# Patient Record
(demographics unavailable — no encounter records)

---

## 2025-01-28 NOTE — ASSESSMENT
[FreeTextEntry1] : HLD- Recommend a statin since 10 yr risk of CAD is 18 %. Deferred but he agrees to a CCS Score is 6, I still want him to take a statin, his PCP gave him Crestor which he will take. Told to take 5 mg QOD, and not randomly. He still has not committed to it. He takes twice a week. Crestor 10MG QOD. Told to take Crestor 10 mg QOD. Goal is LDL < 70, 107.  Consider increasing to 20 QOD  Tobacco- Will need to motivate pt to quit.  Urine- Trace proteinuria  Pulmonary Nodule- 5 mm nodule seen on CT done for a CCS Aug 2021. He needs a 1 year f/u. Discussed with pt 9/21/21, he will try the patches for 10 weeks. CT Aug 2022, 5 mm nodule stable but new 5 mm Nodule, rec repeat CT in 6 mos. Discussed with pt and wife. He enjoys smoking, no intention to quit. New 5mm nodule April 2023, short term f/u ordered.  Stable Dec 2023,and Dec 2024  f/u 1 yr  Borderline HTN- Non-Pharm approach reviewed.  Colonoscopy current, PSA NL 2023. Needs EDILSON.

## 2025-01-28 NOTE — REASON FOR VISIT
[FreeTextEntry1] : 63 y/o Male without signif PMH and no prior surgery.  here without c/o.  Currrently smokes 1 PPWeek since age 23. Had Covid vaccines.  Recent labs reveal  NL glucose and Cr.  , HDL 54, , TG was 291 He does exercise, has a [place]. 2/7/22 Still smokes 3-4 cigarettes daily.  BP borderline up, takes Crestor randomly, LDL 98 Nov 2021 with PCP 9/6/22 Here with his wife, medical hx reviewed in detail. 8/2/23 , A1C 5.5, still smoking, wife present 12/21/23 Still exercising LDL  107 1/28/25 , smoking a little NSR, normal axis and intervals, no ST-Tw abnormalities. 8/17/21